# Patient Record
Sex: FEMALE
[De-identification: names, ages, dates, MRNs, and addresses within clinical notes are randomized per-mention and may not be internally consistent; named-entity substitution may affect disease eponyms.]

---

## 2017-01-19 ENCOUNTER — ASOB RESULT (OUTPATIENT)
Age: 37
End: 2017-01-19

## 2017-01-19 ENCOUNTER — APPOINTMENT (OUTPATIENT)
Dept: ANTEPARTUM | Facility: CLINIC | Age: 37
End: 2017-01-19

## 2017-03-16 ENCOUNTER — ASOB RESULT (OUTPATIENT)
Age: 37
End: 2017-03-16

## 2017-03-16 ENCOUNTER — APPOINTMENT (OUTPATIENT)
Dept: ANTEPARTUM | Facility: CLINIC | Age: 37
End: 2017-03-16

## 2017-03-30 ENCOUNTER — ASOB RESULT (OUTPATIENT)
Age: 37
End: 2017-03-30

## 2017-03-30 ENCOUNTER — APPOINTMENT (OUTPATIENT)
Dept: ANTEPARTUM | Facility: CLINIC | Age: 37
End: 2017-03-30

## 2017-07-21 ENCOUNTER — OUTPATIENT (OUTPATIENT)
Dept: OUTPATIENT SERVICES | Facility: HOSPITAL | Age: 37
LOS: 1 days | End: 2017-07-21

## 2017-07-21 DIAGNOSIS — O34.219 MATERNAL CARE FOR UNSPECIFIED TYPE SCAR FROM PREVIOUS CESAREAN DELIVERY: ICD-10-CM

## 2017-07-21 LAB
APPEARANCE UR: CLEAR — SIGNIFICANT CHANGE UP
BILIRUB UR-MCNC: NEGATIVE — SIGNIFICANT CHANGE UP
BLD GP AB SCN SERPL QL: NEGATIVE — SIGNIFICANT CHANGE UP
BLOOD UR QL VISUAL: NEGATIVE — SIGNIFICANT CHANGE UP
COLOR SPEC: SIGNIFICANT CHANGE UP
GLUCOSE UR-MCNC: NEGATIVE — SIGNIFICANT CHANGE UP
HCT VFR BLD CALC: 36.4 % — SIGNIFICANT CHANGE UP (ref 34.5–45)
HGB BLD-MCNC: 11.6 G/DL — SIGNIFICANT CHANGE UP (ref 11.5–15.5)
KETONES UR-MCNC: NEGATIVE — SIGNIFICANT CHANGE UP
LEUKOCYTE ESTERASE UR-ACNC: NEGATIVE — SIGNIFICANT CHANGE UP
MCHC RBC-ENTMCNC: 28.4 PG — SIGNIFICANT CHANGE UP (ref 27–34)
MCHC RBC-ENTMCNC: 31.9 % — LOW (ref 32–36)
MCV RBC AUTO: 89 FL — SIGNIFICANT CHANGE UP (ref 80–100)
MUCOUS THREADS # UR AUTO: SIGNIFICANT CHANGE UP
NITRITE UR-MCNC: NEGATIVE — SIGNIFICANT CHANGE UP
NON-SQ EPI CELLS # UR AUTO: <1 — SIGNIFICANT CHANGE UP
NRBC # FLD: 0 — SIGNIFICANT CHANGE UP
PH UR: 6.5 — SIGNIFICANT CHANGE UP (ref 4.6–8)
PLATELET # BLD AUTO: 216 K/UL — SIGNIFICANT CHANGE UP (ref 150–400)
PMV BLD: 10.7 FL — SIGNIFICANT CHANGE UP (ref 7–13)
PROT UR-MCNC: NEGATIVE — SIGNIFICANT CHANGE UP
RBC # BLD: 4.09 M/UL — SIGNIFICANT CHANGE UP (ref 3.8–5.2)
RBC # FLD: 14.3 % — SIGNIFICANT CHANGE UP (ref 10.3–14.5)
RBC CASTS # UR COMP ASSIST: SIGNIFICANT CHANGE UP (ref 0–?)
RH IG SCN BLD-IMP: POSITIVE — SIGNIFICANT CHANGE UP
SP GR SPEC: 1.01 — SIGNIFICANT CHANGE UP (ref 1–1.03)
SQUAMOUS # UR AUTO: SIGNIFICANT CHANGE UP
UROBILINOGEN FLD QL: NORMAL E.U. — SIGNIFICANT CHANGE UP (ref 0.1–0.2)
WBC # BLD: 8.3 K/UL — SIGNIFICANT CHANGE UP (ref 3.8–10.5)
WBC # FLD AUTO: 8.3 K/UL — SIGNIFICANT CHANGE UP (ref 3.8–10.5)
WBC UR QL: SIGNIFICANT CHANGE UP (ref 0–?)

## 2017-07-22 ENCOUNTER — TRANSCRIPTION ENCOUNTER (OUTPATIENT)
Age: 37
End: 2017-07-22

## 2017-07-22 ENCOUNTER — INPATIENT (INPATIENT)
Facility: HOSPITAL | Age: 37
LOS: 1 days | Discharge: HOME | End: 2017-07-24
Attending: OBSTETRICS & GYNECOLOGY | Admitting: OBSTETRICS & GYNECOLOGY

## 2017-07-22 VITALS — WEIGHT: 141.1 LBS | HEIGHT: 62.5 IN

## 2017-07-22 DIAGNOSIS — Z3A.00 WEEKS OF GESTATION OF PREGNANCY NOT SPECIFIED: ICD-10-CM

## 2017-07-22 DIAGNOSIS — O26.899 OTHER SPECIFIED PREGNANCY RELATED CONDITIONS, UNSPECIFIED TRIMESTER: ICD-10-CM

## 2017-07-22 LAB
BASOPHILS # BLD AUTO: 0.03 K/UL — SIGNIFICANT CHANGE UP (ref 0–0.2)
BASOPHILS NFR BLD AUTO: 0.3 % — SIGNIFICANT CHANGE UP (ref 0–2)
BLD GP AB SCN SERPL QL: NEGATIVE — SIGNIFICANT CHANGE UP
EOSINOPHIL # BLD AUTO: 0.04 K/UL — SIGNIFICANT CHANGE UP (ref 0–0.5)
EOSINOPHIL NFR BLD AUTO: 0.4 % — SIGNIFICANT CHANGE UP (ref 0–6)
HCT VFR BLD CALC: 35.3 % — SIGNIFICANT CHANGE UP (ref 34.5–45)
HGB BLD-MCNC: 11.5 G/DL — SIGNIFICANT CHANGE UP (ref 11.5–15.5)
IMM GRANULOCYTES # BLD AUTO: 0.05 # — SIGNIFICANT CHANGE UP
IMM GRANULOCYTES NFR BLD AUTO: 0.5 % — SIGNIFICANT CHANGE UP (ref 0–1.5)
LYMPHOCYTES # BLD AUTO: 1.8 K/UL — SIGNIFICANT CHANGE UP (ref 1–3.3)
LYMPHOCYTES # BLD AUTO: 19.2 % — SIGNIFICANT CHANGE UP (ref 13–44)
MCHC RBC-ENTMCNC: 28.9 PG — SIGNIFICANT CHANGE UP (ref 27–34)
MCHC RBC-ENTMCNC: 32.6 % — SIGNIFICANT CHANGE UP (ref 32–36)
MCV RBC AUTO: 88.7 FL — SIGNIFICANT CHANGE UP (ref 80–100)
MONOCYTES # BLD AUTO: 0.88 K/UL — SIGNIFICANT CHANGE UP (ref 0–0.9)
MONOCYTES NFR BLD AUTO: 9.4 % — SIGNIFICANT CHANGE UP (ref 2–14)
NEUTROPHILS # BLD AUTO: 6.56 K/UL — SIGNIFICANT CHANGE UP (ref 1.8–7.4)
NEUTROPHILS NFR BLD AUTO: 70.2 % — SIGNIFICANT CHANGE UP (ref 43–77)
NRBC # FLD: 0 — SIGNIFICANT CHANGE UP
PLATELET # BLD AUTO: 222 K/UL — SIGNIFICANT CHANGE UP (ref 150–400)
PMV BLD: 10.4 FL — SIGNIFICANT CHANGE UP (ref 7–13)
RBC # BLD: 3.98 M/UL — SIGNIFICANT CHANGE UP (ref 3.8–5.2)
RBC # FLD: 14.3 % — SIGNIFICANT CHANGE UP (ref 10.3–14.5)
RH IG SCN BLD-IMP: POSITIVE — SIGNIFICANT CHANGE UP
T PALLIDUM AB TITR SER: NEGATIVE — SIGNIFICANT CHANGE UP
WBC # BLD: 9.36 K/UL — SIGNIFICANT CHANGE UP (ref 3.8–10.5)
WBC # FLD AUTO: 9.36 K/UL — SIGNIFICANT CHANGE UP (ref 3.8–10.5)

## 2017-07-22 RX ORDER — AER TRAVELER 0.5 G/1
1 SOLUTION RECTAL; TOPICAL EVERY 4 HOURS
Qty: 0 | Refills: 0 | Status: DISCONTINUED | OUTPATIENT
Start: 2017-07-22 | End: 2017-07-23

## 2017-07-22 RX ORDER — OXYCODONE AND ACETAMINOPHEN 5; 325 MG/1; MG/1
2 TABLET ORAL EVERY 6 HOURS
Qty: 0 | Refills: 0 | Status: DISCONTINUED | OUTPATIENT
Start: 2017-07-23 | End: 2017-07-23

## 2017-07-22 RX ORDER — HYDROCORTISONE 1 %
1 OINTMENT (GRAM) TOPICAL EVERY 4 HOURS
Qty: 0 | Refills: 0 | Status: DISCONTINUED | OUTPATIENT
Start: 2017-07-23 | End: 2017-07-23

## 2017-07-22 RX ORDER — IBUPROFEN 200 MG
600 TABLET ORAL EVERY 6 HOURS
Qty: 0 | Refills: 0 | Status: COMPLETED | OUTPATIENT
Start: 2017-07-23 | End: 2018-06-21

## 2017-07-22 RX ORDER — OXYTOCIN 10 UNIT/ML
41.67 VIAL (ML) INJECTION
Qty: 20 | Refills: 0 | Status: DISCONTINUED | OUTPATIENT
Start: 2017-07-23 | End: 2017-07-23

## 2017-07-22 RX ORDER — DIBUCAINE 1 %
1 OINTMENT (GRAM) RECTAL EVERY 4 HOURS
Qty: 0 | Refills: 0 | Status: DISCONTINUED | OUTPATIENT
Start: 2017-07-23 | End: 2017-07-24

## 2017-07-22 RX ORDER — PRAMOXINE HYDROCHLORIDE 150 MG/15G
1 AEROSOL, FOAM RECTAL EVERY 4 HOURS
Qty: 0 | Refills: 0 | Status: DISCONTINUED | OUTPATIENT
Start: 2017-07-23 | End: 2017-07-23

## 2017-07-22 RX ORDER — ESOMEPRAZOLE MAGNESIUM 40 MG/1
0 CAPSULE, DELAYED RELEASE ORAL
Qty: 0 | Refills: 0 | COMMUNITY

## 2017-07-22 RX ORDER — OXYTOCIN 10 UNIT/ML
333.33 VIAL (ML) INJECTION
Qty: 20 | Refills: 0 | Status: COMPLETED | OUTPATIENT
Start: 2017-07-22

## 2017-07-22 RX ORDER — SIMETHICONE 80 MG/1
80 TABLET, CHEWABLE ORAL EVERY 6 HOURS
Qty: 0 | Refills: 0 | Status: DISCONTINUED | OUTPATIENT
Start: 2017-07-23 | End: 2017-07-24

## 2017-07-22 RX ORDER — PENICILLIN G POTASSIUM 5000000 [IU]/1
POWDER, FOR SOLUTION INTRAMUSCULAR; INTRAPLEURAL; INTRATHECAL; INTRAVENOUS
Qty: 0 | Refills: 0 | Status: DISCONTINUED | OUTPATIENT
Start: 2017-07-22 | End: 2017-07-23

## 2017-07-22 RX ORDER — DIBUCAINE 1 %
1 OINTMENT (GRAM) RECTAL EVERY 4 HOURS
Qty: 0 | Refills: 0 | Status: DISCONTINUED | OUTPATIENT
Start: 2017-07-22 | End: 2017-07-23

## 2017-07-22 RX ORDER — IBUPROFEN 200 MG
600 TABLET ORAL EVERY 6 HOURS
Qty: 0 | Refills: 0 | Status: DISCONTINUED | OUTPATIENT
Start: 2017-07-23 | End: 2017-07-23

## 2017-07-22 RX ORDER — OXYTOCIN 10 UNIT/ML
1 VIAL (ML) INJECTION
Qty: 30 | Refills: 0 | Status: DISCONTINUED | OUTPATIENT
Start: 2017-07-22 | End: 2017-07-23

## 2017-07-22 RX ORDER — OXYTOCIN 10 UNIT/ML
333.33 VIAL (ML) INJECTION
Qty: 20 | Refills: 0 | Status: DISCONTINUED | OUTPATIENT
Start: 2017-07-22 | End: 2017-07-23

## 2017-07-22 RX ORDER — SODIUM CHLORIDE 9 MG/ML
1000 INJECTION, SOLUTION INTRAVENOUS
Qty: 0 | Refills: 0 | Status: DISCONTINUED | OUTPATIENT
Start: 2017-07-22 | End: 2017-07-23

## 2017-07-22 RX ORDER — PRAMOXINE HYDROCHLORIDE 150 MG/15G
1 AEROSOL, FOAM RECTAL EVERY 4 HOURS
Qty: 0 | Refills: 0 | Status: DISCONTINUED | OUTPATIENT
Start: 2017-07-22 | End: 2017-07-23

## 2017-07-22 RX ORDER — MAGNESIUM HYDROXIDE 400 MG/1
30 TABLET, CHEWABLE ORAL
Qty: 0 | Refills: 0 | Status: DISCONTINUED | OUTPATIENT
Start: 2017-07-23 | End: 2017-07-24

## 2017-07-22 RX ORDER — DOCUSATE SODIUM 100 MG
100 CAPSULE ORAL
Qty: 0 | Refills: 0 | Status: DISCONTINUED | OUTPATIENT
Start: 2017-07-23 | End: 2017-07-24

## 2017-07-22 RX ORDER — SODIUM CHLORIDE 9 MG/ML
3 INJECTION INTRAMUSCULAR; INTRAVENOUS; SUBCUTANEOUS EVERY 8 HOURS
Qty: 0 | Refills: 0 | Status: DISCONTINUED | OUTPATIENT
Start: 2017-07-22 | End: 2017-07-23

## 2017-07-22 RX ORDER — OXYTOCIN 10 UNIT/ML
41.67 VIAL (ML) INJECTION
Qty: 20 | Refills: 0 | Status: DISCONTINUED | OUTPATIENT
Start: 2017-07-22 | End: 2017-07-23

## 2017-07-22 RX ORDER — TETANUS TOXOID, REDUCED DIPHTHERIA TOXOID AND ACELLULAR PERTUSSIS VACCINE, ADSORBED 5; 2.5; 8; 8; 2.5 [IU]/.5ML; [IU]/.5ML; UG/.5ML; UG/.5ML; UG/.5ML
0.5 SUSPENSION INTRAMUSCULAR ONCE
Qty: 0 | Refills: 0 | Status: DISCONTINUED | OUTPATIENT
Start: 2017-07-23 | End: 2017-07-24

## 2017-07-22 RX ORDER — OXYCODONE HYDROCHLORIDE 5 MG/1
5 TABLET ORAL
Qty: 0 | Refills: 0 | Status: DISCONTINUED | OUTPATIENT
Start: 2017-07-23 | End: 2017-07-24

## 2017-07-22 RX ORDER — SODIUM CHLORIDE 9 MG/ML
1000 INJECTION, SOLUTION INTRAVENOUS ONCE
Qty: 0 | Refills: 0 | Status: COMPLETED | OUTPATIENT
Start: 2017-07-22 | End: 2017-07-22

## 2017-07-22 RX ORDER — LANOLIN
1 OINTMENT (GRAM) TOPICAL EVERY 6 HOURS
Qty: 0 | Refills: 0 | Status: DISCONTINUED | OUTPATIENT
Start: 2017-07-23 | End: 2017-07-24

## 2017-07-22 RX ORDER — AER TRAVELER 0.5 G/1
1 SOLUTION RECTAL; TOPICAL EVERY 4 HOURS
Qty: 0 | Refills: 0 | Status: DISCONTINUED | OUTPATIENT
Start: 2017-07-23 | End: 2017-07-24

## 2017-07-22 RX ORDER — GLYCERIN ADULT
1 SUPPOSITORY, RECTAL RECTAL AT BEDTIME
Qty: 0 | Refills: 0 | Status: DISCONTINUED | OUTPATIENT
Start: 2017-07-23 | End: 2017-07-24

## 2017-07-22 RX ORDER — HYDROCORTISONE 1 %
1 OINTMENT (GRAM) TOPICAL EVERY 4 HOURS
Qty: 0 | Refills: 0 | Status: DISCONTINUED | OUTPATIENT
Start: 2017-07-22 | End: 2017-07-23

## 2017-07-22 RX ORDER — PENICILLIN G POTASSIUM 5000000 [IU]/1
2.5 POWDER, FOR SOLUTION INTRAMUSCULAR; INTRAPLEURAL; INTRATHECAL; INTRAVENOUS EVERY 4 HOURS
Qty: 0 | Refills: 0 | Status: DISCONTINUED | OUTPATIENT
Start: 2017-07-22 | End: 2017-07-23

## 2017-07-22 RX ORDER — PENICILLIN G POTASSIUM 5000000 [IU]/1
5 POWDER, FOR SOLUTION INTRAMUSCULAR; INTRAPLEURAL; INTRATHECAL; INTRAVENOUS ONCE
Qty: 0 | Refills: 0 | Status: COMPLETED | OUTPATIENT
Start: 2017-07-22 | End: 2017-07-22

## 2017-07-22 RX ORDER — KETOROLAC TROMETHAMINE 30 MG/ML
30 SYRINGE (ML) INJECTION ONCE
Qty: 0 | Refills: 0 | Status: DISCONTINUED | OUTPATIENT
Start: 2017-07-23 | End: 2017-07-23

## 2017-07-22 RX ORDER — SODIUM CHLORIDE 9 MG/ML
3 INJECTION INTRAMUSCULAR; INTRAVENOUS; SUBCUTANEOUS EVERY 8 HOURS
Qty: 0 | Refills: 0 | Status: DISCONTINUED | OUTPATIENT
Start: 2017-07-23 | End: 2017-07-24

## 2017-07-22 RX ORDER — ACETAMINOPHEN 500 MG
650 TABLET ORAL EVERY 6 HOURS
Qty: 0 | Refills: 0 | Status: DISCONTINUED | OUTPATIENT
Start: 2017-07-23 | End: 2017-07-23

## 2017-07-22 RX ORDER — OXYCODONE HYDROCHLORIDE 5 MG/1
5 TABLET ORAL EVERY 4 HOURS
Qty: 0 | Refills: 0 | Status: DISCONTINUED | OUTPATIENT
Start: 2017-07-23 | End: 2017-07-24

## 2017-07-22 RX ORDER — ACETAMINOPHEN 500 MG
975 TABLET ORAL EVERY 6 HOURS
Qty: 0 | Refills: 0 | Status: COMPLETED | OUTPATIENT
Start: 2017-07-23 | End: 2018-06-21

## 2017-07-22 RX ORDER — DIPHENHYDRAMINE HCL 50 MG
25 CAPSULE ORAL EVERY 6 HOURS
Qty: 0 | Refills: 0 | Status: DISCONTINUED | OUTPATIENT
Start: 2017-07-23 | End: 2017-07-24

## 2017-07-22 RX ADMIN — Medication 125 MILLIUNIT(S)/MIN: at 22:30

## 2017-07-22 RX ADMIN — SODIUM CHLORIDE 125 MILLILITER(S): 9 INJECTION, SOLUTION INTRAVENOUS at 19:36

## 2017-07-22 RX ADMIN — Medication 1000 MILLIUNIT(S)/MIN: at 22:32

## 2017-07-22 RX ADMIN — SODIUM CHLORIDE 125 MILLILITER(S): 9 INJECTION, SOLUTION INTRAVENOUS at 18:06

## 2017-07-22 RX ADMIN — PENICILLIN G POTASSIUM 200 MILLION UNIT(S): 5000000 POWDER, FOR SOLUTION INTRAMUSCULAR; INTRAPLEURAL; INTRATHECAL; INTRAVENOUS at 17:37

## 2017-07-22 RX ADMIN — SODIUM CHLORIDE 125 MILLILITER(S): 9 INJECTION, SOLUTION INTRAVENOUS at 17:37

## 2017-07-22 RX ADMIN — SODIUM CHLORIDE 2000 MILLILITER(S): 9 INJECTION, SOLUTION INTRAVENOUS at 18:06

## 2017-07-22 RX ADMIN — Medication 1 MILLIUNIT(S)/MIN: at 19:35

## 2017-07-22 NOTE — DISCHARGE NOTE OB - CARE PROVIDER_API CALL
Chloé Steel (MD), Obstetrics and Gynecology  [address] [place] [address]  Phone: [phone]  Fax: [phone]

## 2017-07-22 NOTE — DISCHARGE NOTE OB - MEDICATION SUMMARY - MEDICATIONS TO TAKE
I will START or STAY ON the medications listed below when I get home from the hospital:  None I will START or STAY ON the medications listed below when I get home from the hospital:    acetaminophen 325 mg oral tablet  -- 3 tab(s) by mouth every 6 hours  -- Indication: For pain management    ibuprofen 600 mg oral tablet  -- 1 tab(s) by mouth every 6 hours  -- Indication: For pain management I will START or STAY ON the medications listed below when I get home from the hospital:    acetaminophen 325 mg oral tablet  -- 3 tab(s) by mouth every 6 hours, As Needed  -- Indication: For pain    ibuprofen 600 mg oral tablet  -- 1 tab(s) by mouth every 6 hours, As Needed  -- Indication: For pain    Prenatal Multivitamins with Folic Acid 1 mg oral tablet  -- 1 tab(s) by mouth once a day  -- Indication: For Vitamins

## 2017-07-22 NOTE — DISCHARGE NOTE OB - MATERIALS PROVIDED
[facility] Hearing Screen Program/[facility] [place] Screening Program/Guide to Postpartum Care/Shaken Baby Prevention Handout/Tdap Vaccination (VIS Pub Date: 2012)/  Immunization Record/Breastfeeding Log/Birth Certificate Instructions/Vaccinations

## 2017-07-22 NOTE — DISCHARGE NOTE OB - PATIENT PORTAL LINK FT
“You can access the Follow[facility] Patient Portal, offered by [facility], by registering with the following website: [email or web address]”

## 2017-07-22 NOTE — DISCHARGE NOTE OB - CARE PLAN
Principal Discharge DX:	Vacuum extractor delivery, delivered  Goal:	Home  Instructions for follow-up, activity and diet:	6 weeks, vaginal rest, regular diet

## 2017-07-23 RX ORDER — PRAMOXINE HYDROCHLORIDE 150 MG/15G
1 AEROSOL, FOAM RECTAL EVERY 4 HOURS
Qty: 0 | Refills: 0 | Status: DISCONTINUED | OUTPATIENT
Start: 2017-07-23 | End: 2017-07-24

## 2017-07-23 RX ORDER — ACETAMINOPHEN 500 MG
3 TABLET ORAL
Qty: 0 | Refills: 0 | COMMUNITY
Start: 2017-07-23

## 2017-07-23 RX ORDER — HYDROCORTISONE 1 %
1 OINTMENT (GRAM) TOPICAL EVERY 4 HOURS
Qty: 0 | Refills: 0 | Status: DISCONTINUED | OUTPATIENT
Start: 2017-07-23 | End: 2017-07-24

## 2017-07-23 RX ORDER — ACETAMINOPHEN 500 MG
975 TABLET ORAL EVERY 6 HOURS
Qty: 0 | Refills: 0 | Status: DISCONTINUED | OUTPATIENT
Start: 2017-07-23 | End: 2017-07-24

## 2017-07-23 RX ORDER — IBUPROFEN 200 MG
1 TABLET ORAL
Qty: 0 | Refills: 0 | COMMUNITY
Start: 2017-07-23

## 2017-07-23 RX ORDER — IBUPROFEN 200 MG
600 TABLET ORAL EVERY 6 HOURS
Qty: 0 | Refills: 0 | Status: DISCONTINUED | OUTPATIENT
Start: 2017-07-23 | End: 2017-07-24

## 2017-07-23 RX ADMIN — SODIUM CHLORIDE 3 MILLILITER(S): 9 INJECTION INTRAMUSCULAR; INTRAVENOUS; SUBCUTANEOUS at 06:18

## 2017-07-23 RX ADMIN — Medication 975 MILLIGRAM(S): at 11:00

## 2017-07-23 RX ADMIN — Medication 650 MILLIGRAM(S): at 02:30

## 2017-07-23 RX ADMIN — Medication 100 MILLIGRAM(S): at 20:00

## 2017-07-23 RX ADMIN — Medication 975 MILLIGRAM(S): at 20:00

## 2017-07-23 RX ADMIN — Medication 650 MILLIGRAM(S): at 03:00

## 2017-07-23 RX ADMIN — Medication 600 MILLIGRAM(S): at 20:00

## 2017-07-23 RX ADMIN — Medication 1 TABLET(S): at 11:41

## 2017-07-23 RX ADMIN — Medication 975 MILLIGRAM(S): at 12:00

## 2017-07-23 RX ADMIN — SODIUM CHLORIDE 3 MILLILITER(S): 9 INJECTION INTRAMUSCULAR; INTRAVENOUS; SUBCUTANEOUS at 14:00

## 2017-07-23 NOTE — PROGRESS NOTE ADULT - SUBJECTIVE AND OBJECTIVE BOX
PPD#1   Patient seen and examined at bedside, no acute overnight events. No acute complaints, pain well controlled.  Patient is ambulating, voiding spontaneously, passing gas, and tolerating regular diet. Pt is breast feeding her baby.    Vital Signs Last 24 Hours  T(C): 36.8 (07-23-17 @ 01:40), Max: 36.8 (07-22-17 @ 22:20)  HR: 97 (07-23-17 @ 01:40) (83 - 104)  BP: 112/64 (07-23-17 @ 01:40) (11/58 - 114/56)  RR: 18 (07-23-17 @ 01:40) (18 - 18)  SpO2: 100% (07-23-17 @ 01:40) (100% - 100%)    Physical Exam:  General: NAD  Abdomen: Soft, non-tender, non-distended, fundus firm  Pelvic: Lochia wnl  Ext: NTBL  Labs:  Blood type: B Positive  Rubella IgG: RPR: Negative                          11.5   9.36 >-----------< 222    ( 07-22 @ 17:30 )             35.3                        11.6   8.30 >-----------< 216    ( 07-21 @ 10:08 )             36.4                  MEDICATIONS  (STANDING):  oxytocin Infusion 1 milliUNIT(s)/Min (1 mL/Hr) IV Continuous <Continuous>  oxytocin Infusion 41.667 milliUNIT(s)/Min (125 mL/Hr) IV Continuous <Continuous>  sodium chloride 0.9% lock flush 3 milliLiter(s) IV Push every 8 hours  diphtheria/tetanus/pertussis (acellular) Vaccine (ADAcel) 0.5 milliLiter(s) IntraMuscular once  oxytocin Infusion 41.667 milliUNIT(s)/Min (125 mL/Hr) IV Continuous <Continuous>  prenatal multivitamin 1 Tablet(s) Oral daily  ketorolac   Injectable 30 milliGRAM(s) IV Push once  ibuprofen  Tablet 600 milliGRAM(s) Oral every 6 hours  acetaminophen   Tablet. 975 milliGRAM(s) Oral every 6 hours  oxyCODONE    IR 5 milliGRAM(s) Oral every 3 hours  oxytocin Infusion 333.333 milliUNIT(s)/Min (1000 mL/Hr) IV Continuous <Continuous>    MEDICATIONS  (PRN):  acetaminophen   Tablet. 650 milliGRAM(s) Oral every 6 hours PRN Mild Pain  acetaminophen   Tablet 650 milliGRAM(s) Oral every 6 hours PRN For Temp greater than 38.5 C (101.3 F)  ibuprofen  Tablet 600 milliGRAM(s) Oral every 6 hours PRN Moderate Pain  oxyCODONE    5 mG/acetaminophen 325 mG 2 Tablet(s) Oral every 6 hours PRN Severe Pain  hydrocortisone 1% Cream 1 Application(s) Topical every 4 hours PRN Moderate to Severe Perineal Pain  pramoxine 1%/zinc 5% Cream 1 Application(s) Topical every 4 hours PRN Moderate to Severe Perineal Pain  dibucaine 1% Ointment 1 Application(s) Topical every 4 hours PRN Perineal Discomfort  lanolin Ointment 1 Application(s) Topical every 6 hours PRN Sore Nipples  witch hazel Pads 1 Application(s) Topical every 4 hours PRN Perineal Discomfort  simethicone 80 milliGRAM(s) Chew every 6 hours PRN Gas  diphenhydrAMINE   Capsule 25 milliGRAM(s) Oral every 6 hours PRN Itching  glycerin Suppository - Adult 1 Suppository(s) Rectal at bedtime PRN Constipation  magnesium hydroxide Suspension 30 milliLiter(s) Oral two times a day PRN Constipation  docusate sodium 100 milliGRAM(s) Oral two times a day PRN Stool Softening  oxyCODONE    IR 5 milliGRAM(s) Oral every 4 hours PRN Severe Pain (7 -10)

## 2017-07-24 VITALS
HEART RATE: 87 BPM | OXYGEN SATURATION: 100 % | DIASTOLIC BLOOD PRESSURE: 53 MMHG | SYSTOLIC BLOOD PRESSURE: 100 MMHG | TEMPERATURE: 98 F | RESPIRATION RATE: 18 BRPM

## 2017-07-24 DIAGNOSIS — O26.899 OTHER SPECIFIED PREGNANCY RELATED CONDITIONS, UNSPECIFIED TRIMESTER: ICD-10-CM

## 2017-07-24 DIAGNOSIS — O42.10 PREMATURE RUPTURE OF MEMBRANES, ONSET OF LABOR MORE THAN 24 HOURS FOLLOWING RUPTURE, UNSPECIFIED WEEKS OF GESTATION: ICD-10-CM

## 2017-07-24 LAB — T PALLIDUM AB TITR SER: NEGATIVE — SIGNIFICANT CHANGE UP

## 2017-07-24 RX ADMIN — Medication 975 MILLIGRAM(S): at 03:00

## 2017-07-24 RX ADMIN — Medication 600 MILLIGRAM(S): at 03:00

## 2017-07-24 RX ADMIN — Medication 600 MILLIGRAM(S): at 02:02

## 2017-07-24 RX ADMIN — Medication 975 MILLIGRAM(S): at 02:02

## 2018-06-21 ENCOUNTER — NON-APPOINTMENT (OUTPATIENT)
Age: 38
End: 2018-06-21

## 2018-06-21 ENCOUNTER — APPOINTMENT (OUTPATIENT)
Dept: INTERNAL MEDICINE | Facility: CLINIC | Age: 38
End: 2018-06-21
Payer: COMMERCIAL

## 2018-06-21 VITALS
TEMPERATURE: 98.5 F | WEIGHT: 114 LBS | SYSTOLIC BLOOD PRESSURE: 100 MMHG | BODY MASS INDEX: 20.98 KG/M2 | OXYGEN SATURATION: 98 % | HEART RATE: 88 BPM | RESPIRATION RATE: 14 BRPM | HEIGHT: 62 IN | DIASTOLIC BLOOD PRESSURE: 60 MMHG

## 2018-06-21 DIAGNOSIS — Z83.3 FAMILY HISTORY OF DIABETES MELLITUS: ICD-10-CM

## 2018-06-21 DIAGNOSIS — N94.89 OTHER SPECIFIED CONDITIONS ASSOCIATED WITH FEMALE GENITAL ORGANS AND MENSTRUAL CYCLE: ICD-10-CM

## 2018-06-21 DIAGNOSIS — Z01.419 ENCOUNTER FOR GYNECOLOGICAL EXAMINATION (GENERAL) (ROUTINE) W/OUT ABNORMAL FINDINGS: ICD-10-CM

## 2018-06-21 DIAGNOSIS — Z82.49 FAMILY HISTORY OF ISCHEMIC HEART DISEASE AND OTHER DISEASES OF THE CIRCULATORY SYSTEM: ICD-10-CM

## 2018-06-21 PROCEDURE — 36415 COLL VENOUS BLD VENIPUNCTURE: CPT

## 2018-06-21 PROCEDURE — 93000 ELECTROCARDIOGRAM COMPLETE: CPT

## 2018-06-21 PROCEDURE — 99385 PREV VISIT NEW AGE 18-39: CPT | Mod: 25

## 2018-06-21 NOTE — REVIEW OF SYSTEMS
[Fever] : no fever [Chills] : no chills [Cough] : no cough [Dyspnea on Exertion] : no dyspnea on exertion [Abdominal Pain] : no abdominal pain [Melena] : no melena [Dizziness] : no dizziness [FreeTextEntry5] : see HPI [FreeTextEntry7] : see HPI [de-identified] : see HPI

## 2018-06-21 NOTE — ASSESSMENT
[FreeTextEntry1] : \par atypical CP, +GH CAD-\par -EKG: NSR @ 80 bpm, nl axis, no LVH/ST/T chgs\par -check labs: cbc/cmp/lipids/A1c\par -cardio referral for eval\par -advised to go to ER if sx's worsen\par \par GERD-\par -trial of PPI (safe with nursing)\par -advised to avoid aggravating foods and meals near bedtime\par -to f/u if sx's worsen\par \par depression- minimal on PHQ 9; denies acute sx's\par -declines BH referral\par -declines info to behavioral health crisis walk in\par -advised to f/u if sx's worsen\par \par nursing-\par -prenatal MVI advised, escribed\par \par \par HCM\par -fasting screening labs; STD/HIV screening\par -declines flu shots\par -hx Tdap 2012\par -hx hep B series\par -hx negative PAP 2017 by GYN per pt, +IUD in place\par -derm referral for screening.  Regular use of sun block for skin cancer prevention advised.\par -yearly dental screening advised, referral given\par -yearly eye screening advised, referral given\par \par Pt request  Jose A be called re: labs/medical care, cell: [phone]

## 2018-06-21 NOTE — HISTORY OF PRESENT ILLNESS
[Reg. Dental Visits] : ~He/She~ does not have regular dental visits [Vision Problems] : She denies vision problems [Hearing Loss] : She denies hearing loss [de-identified] : declines flu shot, Tdap 2012, hx hep B series [de-identified] : \par Accompanied by .\par \par Feeling well today.\par Fasting for labs.\par \par c/o left chest pain- on/off 1.5 mo, no relation to exertion, no worse with exertion\par -pressure-like, last few minutes; not a/w dizziness, sob, palpitations, stress or reflux.  Occ a/w belching and reflux with CP but separate feeling from pressure pain.  Does eat lots of spicy food regularly and eat close to bedtime.\par -denies fever, chills, cough or recent URI/illness\par -+FH CAD: mom with hx MI in 50s\par \par East[place] healthy.\par No formal exercising, is active with 3 young kids\par Reports nl appetite and BMs; denies BRBPR or melena\par Denies n/v/abd pain\par Wt stable in past year\par \par occ feeling sad- 2/2 finances, caring for 3 young kids (youngest 11 mo , is nursing him and he wakes up often)\par -denies HI/SI or anxiety.\par -has good social support\par \par Sexually active with , has IUD for contraception; denies hx STD dx.\par -denies  complaints.

## 2018-06-22 LAB
ALBUMIN SERPL ELPH-MCNC: 5 G/DL
ALP BLD-CCNC: 41 U/L
ALT SERPL-CCNC: 16 U/L
ANION GAP SERPL CALC-SCNC: 16 MMOL/L
AST SERPL-CCNC: 17 U/L
BASOPHILS # BLD AUTO: 0.02 K/UL
BASOPHILS NFR BLD AUTO: 0.3 %
BILIRUB SERPL-MCNC: 1 MG/DL
BUN SERPL-MCNC: 14 MG/DL
C TRACH RRNA SPEC QL NAA+PROBE: NOT DETECTED
CALCIUM SERPL-MCNC: 9.4 MG/DL
CHLORIDE SERPL-SCNC: 103 MMOL/L
CHOLEST SERPL-MCNC: 203 MG/DL
CHOLEST/HDLC SERPL: 4.5 RATIO
CO2 SERPL-SCNC: 22 MMOL/L
CREAT SERPL-MCNC: 0.72 MG/DL
EOSINOPHIL # BLD AUTO: 0.05 K/UL
EOSINOPHIL NFR BLD AUTO: 0.8 %
GLUCOSE SERPL-MCNC: 103 MG/DL
HBA1C MFR BLD HPLC: 5.6 %
HBV CORE IGG+IGM SER QL: NONREACTIVE
HBV SURFACE AB SER QL: REACTIVE
HBV SURFACE AG SER QL: NONREACTIVE
HCT VFR BLD CALC: 43.5 %
HCV AB SER QL: NONREACTIVE
HCV S/CO RATIO: 0.13 S/CO
HDLC SERPL-MCNC: 45 MG/DL
HGB BLD-MCNC: 14.3 G/DL
HIV1+2 AB SPEC QL IA.RAPID: NONREACTIVE
IMM GRANULOCYTES NFR BLD AUTO: 0.2 %
LDLC SERPL CALC-MCNC: 146 MG/DL
LYMPHOCYTES # BLD AUTO: 1.66 K/UL
LYMPHOCYTES NFR BLD AUTO: 26.5 %
MAN DIFF?: NORMAL
MCHC RBC-ENTMCNC: 30.4 PG
MCHC RBC-ENTMCNC: 32.9 GM/DL
MCV RBC AUTO: 92.4 FL
MONOCYTES # BLD AUTO: 0.28 K/UL
MONOCYTES NFR BLD AUTO: 4.5 %
N GONORRHOEA RRNA SPEC QL NAA+PROBE: NOT DETECTED
NEUTROPHILS # BLD AUTO: 4.24 K/UL
NEUTROPHILS NFR BLD AUTO: 67.7 %
PLATELET # BLD AUTO: 277 K/UL
POTASSIUM SERPL-SCNC: 4.6 MMOL/L
PROT SERPL-MCNC: 7.9 G/DL
RBC # BLD: 4.71 M/UL
RBC # FLD: 13.5 %
SODIUM SERPL-SCNC: 141 MMOL/L
SOURCE AMPLIFICATION: NORMAL
T PALLIDUM AB SER QL IA: NEGATIVE
TRIGL SERPL-MCNC: 62 MG/DL
TSH SERPL-ACNC: 2.1 UIU/ML
WBC # FLD AUTO: 6.26 K/UL

## 2018-08-30 ENCOUNTER — APPOINTMENT (OUTPATIENT)
Dept: INTERNAL MEDICINE | Facility: CLINIC | Age: 38
End: 2018-08-30

## 2018-12-05 ENCOUNTER — APPOINTMENT (OUTPATIENT)
Dept: INTERNAL MEDICINE | Facility: CLINIC | Age: 38
End: 2018-12-05
Payer: COMMERCIAL

## 2018-12-05 VITALS
BODY MASS INDEX: 21.16 KG/M2 | TEMPERATURE: 98.1 F | SYSTOLIC BLOOD PRESSURE: 112 MMHG | HEIGHT: 62 IN | HEART RATE: 71 BPM | DIASTOLIC BLOOD PRESSURE: 72 MMHG | OXYGEN SATURATION: 98 % | WEIGHT: 115 LBS | RESPIRATION RATE: 14 BRPM

## 2018-12-05 PROCEDURE — 90472 IMMUNIZATION ADMIN EACH ADD: CPT

## 2018-12-05 PROCEDURE — 90734 MENACWYD/MENACWYCRM VACC IM: CPT

## 2018-12-05 PROCEDURE — G0008: CPT

## 2018-12-05 PROCEDURE — 99214 OFFICE O/P EST MOD 30 MIN: CPT | Mod: 25

## 2018-12-05 PROCEDURE — 90686 IIV4 VACC NO PRSV 0.5 ML IM: CPT

## 2018-12-05 NOTE — ASSESSMENT
[FreeTextEntry1] : \par hx atypical CP, +GH CAD- asx since 6/18 per pt\par -6/18 EKG: NSR @ 80 bpm, nl axis, no LVH/ST/T chgs\par -6/18 cbc/cmp/A1c unremarkable\par -cardio referral for eval given prior, pending\par -advised to go to ER if sx's recur\par \par GERD- now resolved\par -hx trial of PPI (safe with nursing), using prn\par -advised to cont to avoid aggravating foods and meals near bedtime\par \par HLD- 6/18 Tchol 203 TG 62  HDL 45\par -low fat diet, exercise advised\par -check lipids, slip given per request\par \par depression- stable, minimal on PHQ 9; denies acute sx's\par -declines BH referral\par -declines info to behavioral health crisis walk in\par -advised to f/u if sx's worsen\par \par nursing-\par -prenatal MVI advised\par \par \par HCM\par -hx CPE 6/18\par -flu shot today\par -hx Tdap 2012\par -hx hep B series\par -meningitis vaccine today for planned [place] 2/19\par -hx negative PAP 2017 by GYN per pt, +IUD in place\par -derm referral for screening- pending.  Regular use of sun block for skin cancer prevention advised.\par -yearly dental screening advised, referral given- pending\par -yearly eye screening advised, referral given\par \par Pt request  Jose A be called re: labs/medical care, cell: [phone]\par pt declines f/u appt, yearly CPE and f/u as needed advised.

## 2018-12-05 NOTE — REVIEW OF SYSTEMS
[Negative] : Neurological [Fever] : no fever [Chills] : no chills [Cough] : no cough [Dyspnea on Exertion] : no dyspnea on exertion [Abdominal Pain] : no abdominal pain [Nausea] : no nausea [Vomiting] : no vomiting [Melena] : no melena [Dizziness] : no dizziness [FreeTextEntry5] : see HPI [FreeTextEntry7] : see HPI [de-identified] : see HPI

## 2018-12-05 NOTE — PHYSICAL EXAM
[No Acute Distress] : no acute distress [Well-Appearing] : well-appearing [Normal Sclera/Conjunctiva] : normal sclera/conjunctiva [PERRL] : pupils equal round and reactive to light [EOMI] : extraocular movements intact [Normal Oropharynx] : the oropharynx was normal [Normal Nasal Mucosa] : the nasal mucosa was normal [Supple] : supple [No Lymphadenopathy] : no lymphadenopathy [Thyroid Normal, No Nodules] : the thyroid was normal and there were no nodules present [No Respiratory Distress] : no respiratory distress  [Clear to Auscultation] : lungs were clear to auscultation bilaterally [Normal Rate] : normal rate  [Regular Rhythm] : with a regular rhythm [Normal S1, S2] : normal S1 and S2 [No Murmur] : no murmur heard [No Edema] : there was no peripheral edema [Soft] : abdomen soft [Non Tender] : non-tender [No HSM] : no HSM [No CVA Tenderness] : no CVA  tenderness [No Spinal Tenderness] : no spinal tenderness [No Joint Swelling] : no joint swelling [Grossly Normal Strength/Tone] : grossly normal strength/tone [No Rash] : no rash [Normal Gait] : normal gait [No Focal Deficits] : no focal deficits [Normal Affect] : the affect was normal [Alert and Oriented x3] : oriented to person, place, and time

## 2018-12-05 NOTE — HISTORY OF PRESENT ILLNESS
[de-identified] : \par Accompanied by .\par \par Feeling well today.\par Not fasting - wants lab slip to check lipids\par \par Needs meningitis vaccine as in 2/19 plans to go to [place].\par \par \par hx left chest pain- no recurrence since last visit \par -hx onset on/off 1.5 mo (reported at 6/18 visit), no relation to exertion, no worse with exertion\par -pressure-like, last few minutes; not a/w dizziness, sob, palpitations, stress or reflux.  Occ a/w belching and reflux with CP but separate feeling from pressure pain.  Does eat lots of spicy food regularly and eat close to bedtime.\par -+FH CAD: mom with hx MI in 50s\par \par GERD- resolved, hx PPI use prn, no recent use.\par -avoids aggravating foods\par \par HLD-\par -eating healthy, cut down on fatty foods\par -no formal exercising, is active with 3 young kids\par \par Reports nl appetite and BMs; denies BRBPR or melena\par Denies n/v/abd pain\par Wt stable in past year\par \par hx occ feeling sad- stable, 2/2 finances, caring for 3 young kids (is nursing him and he wakes up often)\par -denies HI/SI or anxiety.\par -has good social support\par \par \par Denies  complaints.

## 2018-12-06 ENCOUNTER — TRANSCRIPTION ENCOUNTER (OUTPATIENT)
Age: 38
End: 2018-12-06

## 2019-01-12 ENCOUNTER — LABORATORY RESULT (OUTPATIENT)
Age: 39
End: 2019-01-12

## 2019-01-13 LAB
CHOLEST SERPL-MCNC: 224 MG/DL
CHOLEST/HDLC SERPL: 5.9 RATIO
HDLC SERPL-MCNC: 38 MG/DL
LDLC SERPL CALC-MCNC: 154 MG/DL
TRIGL SERPL-MCNC: 160 MG/DL

## 2019-01-18 ENCOUNTER — APPOINTMENT (OUTPATIENT)
Dept: INTERNAL MEDICINE | Facility: CLINIC | Age: 39
End: 2019-01-18
Payer: COMMERCIAL

## 2019-01-18 VITALS
DIASTOLIC BLOOD PRESSURE: 70 MMHG | HEART RATE: 82 BPM | SYSTOLIC BLOOD PRESSURE: 114 MMHG | HEIGHT: 62 IN | BODY MASS INDEX: 21.35 KG/M2 | OXYGEN SATURATION: 98 % | TEMPERATURE: 98.5 F | WEIGHT: 116 LBS

## 2019-01-18 PROCEDURE — 36415 COLL VENOUS BLD VENIPUNCTURE: CPT

## 2019-01-18 PROCEDURE — 99214 OFFICE O/P EST MOD 30 MIN: CPT | Mod: 25

## 2019-01-19 NOTE — ASSESSMENT
[FreeTextEntry1] : 37yo F with pmh of syncope here for follow up - will with issue related to dizziness.\par \par Syncope - ddx include dehydration, low glucose\par                  has cards referral - hod on ordering echo\par                  encourage hydration\par                  r/o insulinoma - send c-peptide and insulin level\par                  referral to neurology\par \par Paresthesia - A1c wnl in June 2018\par                       check B12\par                       try R hand splint\par \par check vitamin D

## 2019-01-19 NOTE — HISTORY OF PRESENT ILLNESS
[FreeTextEntry8] : 39yo F here for acute visit.\par reports episode of syncope in Nov after received 2 shots\par has numbness in her finger tips\par also with episodes of dizziness prior to getting the shots\par \par has 3 children at home\par \par feels gets dizzy whn she does not eat\par eats a full diet - little water intake\par \par Remainder of ROS reviewed and found to be negative.\par

## 2019-01-19 NOTE — PHYSICAL EXAM
[de-identified] : Gen: Adult, NAD\par Head: NC/AT\par EENT: ears grossly normal, PERRL, EOMI moist mucosa\par Neck: supple\par Chest wall: nontender\par CV: normal s1 +s2, rrr, no murmurs\par Pulm: cCTA-B\par Abd: soft, NT, ND\par Skin: no rashes\par Back: no CVA tenderness, no spinal tenderness\par Neuro: gait normal, AAOx3\par Psych: normal affect, normal interaction\par

## 2019-01-21 ENCOUNTER — TRANSCRIPTION ENCOUNTER (OUTPATIENT)
Age: 39
End: 2019-01-21

## 2019-01-26 LAB
25(OH)D3 SERPL-MCNC: 42 NG/ML
C PEPTIDE SERPL-MCNC: 4.3 NG/ML
INSULIN SERPL-MCNC: 26.9 UU/ML
VIT B12 SERPL-MCNC: 969 PG/ML

## 2019-05-02 ENCOUNTER — OTHER (OUTPATIENT)
Age: 39
End: 2019-05-02

## 2019-06-26 ENCOUNTER — APPOINTMENT (OUTPATIENT)
Dept: INTERNAL MEDICINE | Facility: CLINIC | Age: 39
End: 2019-06-26
Payer: COMMERCIAL

## 2019-06-26 VITALS
DIASTOLIC BLOOD PRESSURE: 62 MMHG | HEIGHT: 62 IN | SYSTOLIC BLOOD PRESSURE: 100 MMHG | WEIGHT: 122 LBS | OXYGEN SATURATION: 99 % | BODY MASS INDEX: 22.45 KG/M2 | TEMPERATURE: 97.9 F | HEART RATE: 79 BPM | RESPIRATION RATE: 16 BRPM

## 2019-06-26 DIAGNOSIS — Z87.19 PERSONAL HISTORY OF OTHER DISEASES OF THE DIGESTIVE SYSTEM: ICD-10-CM

## 2019-06-26 DIAGNOSIS — R20.2 PARESTHESIA OF SKIN: ICD-10-CM

## 2019-06-26 DIAGNOSIS — Z87.898 PERSONAL HISTORY OF OTHER SPECIFIED CONDITIONS: ICD-10-CM

## 2019-06-26 DIAGNOSIS — R07.89 OTHER CHEST PAIN: ICD-10-CM

## 2019-06-26 PROCEDURE — 99395 PREV VISIT EST AGE 18-39: CPT

## 2019-06-26 RX ORDER — OMEPRAZOLE 20 MG/1
20 TABLET, DELAYED RELEASE ORAL
Qty: 30 | Refills: 1 | Status: DISCONTINUED | COMMUNITY
Start: 2018-06-21 | End: 2019-06-26

## 2019-06-26 RX ORDER — ERGOCALCIFEROL (VITAMIN D2) 10 MCG
27-0.8 TABLET ORAL DAILY
Qty: 1 | Refills: 1 | Status: DISCONTINUED | COMMUNITY
Start: 2018-06-21 | End: 2019-06-26

## 2019-06-26 NOTE — ASSESSMENT
[FreeTextEntry1] : \par hx syncope - thought vasovagal s/p vaccine administration, hx UC visit with negative labs/EKG, asx\par -neuro eval pending\par \par CTS- resolved\par -using splint prn\par -Tylenol prn \par \par hx atypical CP, +GH CAD- asx since 6/18 per pt\par -followed by cardio ([facility] [place] hx negative stress echo\par -advised to go to ER if sx's recur\par \par GERD- now resolved\par -hx trial of PPI (safe with nursing), using prn\par -advised to cont to avoid aggravating foods and meals near bedtime\par \par HLD- 1/19 Tchol 224   HDL 38\par -low fat diet, exercise advised\par -check lipids, slip given per request\par \par 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  Regular use of sun block for skin cancer prevention advised.\par -yearly dental screening advised, referral given- pending\par -yearly eye screening advised, referral given\par \par Pt request  Jose A be called re: labs/medical care, cell: [phone]\par pt declines f/u appt, yearly CPE and f/u as needed advised.

## 2019-06-26 NOTE — PAST MEDICAL HISTORY
[Total Preg ___] : G[unfilled] [Living ___] : Living: [unfilled] [Irregular Cycle Intervals] : are  irregular [FreeTextEntry1] : has IUD

## 2019-06-26 NOTE — PHYSICAL EXAM
[No Acute Distress] : no acute distress [Well-Appearing] : well-appearing [Normal Sclera/Conjunctiva] : normal sclera/conjunctiva [PERRL] : pupils equal round and reactive to light [EOMI] : extraocular movements intact [Normal Oropharynx] : the oropharynx was normal [Normal Nasal Mucosa] : the nasal mucosa was normal [Thyroid Normal, No Nodules] : the thyroid was normal and there were no nodules present [Supple] : supple [No Lymphadenopathy] : no lymphadenopathy [Normal Rate] : normal rate  [Clear to Auscultation] : lungs were clear to auscultation bilaterally [No Respiratory Distress] : no respiratory distress  [Normal S1, S2] : normal S1 and S2 [Regular Rhythm] : with a regular rhythm [No Murmur] : no murmur heard [No Edema] : there was no peripheral edema [Non Tender] : non-tender [Soft] : abdomen soft [No HSM] : no HSM [No CVA Tenderness] : no CVA  tenderness [No Spinal Tenderness] : no spinal tenderness [Grossly Normal Strength/Tone] : grossly normal strength/tone [No Joint Swelling] : no joint swelling [No Rash] : no rash [Normal Gait] : normal gait [Normal Affect] : the affect was normal [No Focal Deficits] : no focal deficits [Alert and Oriented x3] : oriented to person, place, and time [Normal TMs] : both tympanic membranes were normal [Pedal Pulses Present] : the pedal pulses are present [de-identified] : scattered freckles

## 2019-06-26 NOTE — HISTORY OF PRESENT ILLNESS
[Health Maintenance] : health maintenance [___ Daughter(s)] : [unfilled] daughter(s) [Spouse] : spouse [___ Son(s)] : [unfilled] son(s) [Mother] : mother [Working Part-Time] : working part-time [] :  [Never Smoked Cigarettes] : has never smoked cigarettes [Occupation ___] : occupation: [unfilled] [Never] : never drinking alcohol [Good] : good [Premenopausal] : the patient is premenopausal [Healthy Diet] : She consumes a diverse and healthy diet [de-identified] : \par Accompanied by .\par \par Feeling well today.\par Last ate 2 hrs ago- bread and veggie, wants lab slip\par \par \par Seen in office 1/19 by another provide s/p UC visit post syncope thought vasovagal s/p vaccines (flu and meningitis)- referred to neuro for eval, pending\par -reports well since visit, no recurrence of syncope, denies hx similar prior.  Episode witnessed by , denies incontinence, seizure activity or postictal state\par -denies HA, dizziness, dysarthria or focal weakness\par -request neuro referral again\par -reports hx right finger numbness reported at prior visit- has resolved, using splint prn\par \par hx left chest pain, +FH CAD- resolved > 1 yr\par -followed by cardio ([facility] [place])- hx negative stress echo\par \par GERD- resolved, hx PPI use prn, no recent use.\par -avoids aggravating foods\par \par HLD-\par -eating healthy, cut down on fatty foods\par -no formal exercising, is active with 3 young kids\par \par Reports nl appetite and BMs; denies BRBPR or melena\par Denies n/v/abd pain\par Wt stable\par \par Denies depression or anxiety.\par \par \par Denies  complaints.\par Denies hx STD dx. [Reg. Dental Visits] : ~He/She~ does not have regular dental visits [Vision Problems] : She denies vision problems [Hearing Loss] : She denies hearing loss [Regular Exercise] : She does not exercise regularly [de-identified] : declines flu shot, Tdap 2012, hx hep B series [de-identified] : 6/18

## 2019-06-26 NOTE — REVIEW OF SYSTEMS
[Negative] : Psychiatric [Fever] : no fever [Chills] : no chills [Abdominal Pain] : no abdominal pain [Cough] : no cough [Dyspnea on Exertion] : no dyspnea on exertion [Nausea] : no nausea [Unsteady Walking] : no ataxia [Melena] : no melena [Dizziness] : no dizziness [de-identified] : see HPI [FreeTextEntry5] : see HPI

## 2019-06-26 NOTE — HEALTH RISK ASSESSMENT
[0] : 2) Feeling down, depressed, or hopeless: Not at all (0) [Patient reported PAP Smear was normal] : Patient reported PAP Smear was normal [HIV Test offered] : HIV Test offered [Carbon Monoxide Detector] : carbon monoxide detector [Smoke Detector] : smoke detector [Seat Belt] :  uses seat belt [Sunscreen] : uses sunscreen [No] : No [Guns at Home] : no guns at home [HIVDate] : 06/18 [PapSmearDate] : 01/17 [HepatitisCDate] : 06/18 [HepatitisCComments] : negative [HIVComments] : negative

## 2019-08-02 ENCOUNTER — TRANSCRIPTION ENCOUNTER (OUTPATIENT)
Age: 39
End: 2019-08-02

## 2019-08-02 LAB
25(OH)D3 SERPL-MCNC: 34 NG/ML
ALBUMIN SERPL ELPH-MCNC: 4.6 G/DL
ALP BLD-CCNC: 38 U/L
ALT SERPL-CCNC: 19 U/L
ANION GAP SERPL CALC-SCNC: 14 MMOL/L
AST SERPL-CCNC: 18 U/L
BASOPHILS # BLD AUTO: 0.05 K/UL
BASOPHILS NFR BLD AUTO: 0.6 %
BILIRUB SERPL-MCNC: 0.5 MG/DL
BUN SERPL-MCNC: 10 MG/DL
C TRACH RRNA SPEC QL NAA+PROBE: NOT DETECTED
CALCIUM SERPL-MCNC: 9 MG/DL
CHLORIDE SERPL-SCNC: 103 MMOL/L
CHOLEST SERPL-MCNC: 210 MG/DL
CHOLEST/HDLC SERPL: 5.8 RATIO
CO2 SERPL-SCNC: 24 MMOL/L
CREAT SERPL-MCNC: 0.67 MG/DL
EOSINOPHIL # BLD AUTO: 0.13 K/UL
EOSINOPHIL NFR BLD AUTO: 1.6 %
ESTIMATED AVERAGE GLUCOSE: 114 MG/DL
GLUCOSE SERPL-MCNC: 90 MG/DL
HBA1C MFR BLD HPLC: 5.6 %
HBV CORE IGG+IGM SER QL: NONREACTIVE
HBV SURFACE AB SER QL: REACTIVE
HBV SURFACE AG SER QL: NONREACTIVE
HCT VFR BLD CALC: 42.3 %
HCV AB SER QL: NONREACTIVE
HCV S/CO RATIO: 0.09 S/CO
HDLC SERPL-MCNC: 36 MG/DL
HGB BLD-MCNC: 13.9 G/DL
HIV1+2 AB SPEC QL IA.RAPID: NONREACTIVE
IMM GRANULOCYTES NFR BLD AUTO: 0.2 %
LDLC SERPL CALC-MCNC: 148 MG/DL
LYMPHOCYTES # BLD AUTO: 2.61 K/UL
LYMPHOCYTES NFR BLD AUTO: 31.4 %
MAN DIFF?: NORMAL
MCHC RBC-ENTMCNC: 30.4 PG
MCHC RBC-ENTMCNC: 32.9 GM/DL
MCV RBC AUTO: 92.6 FL
MONOCYTES # BLD AUTO: 0.48 K/UL
MONOCYTES NFR BLD AUTO: 5.8 %
N GONORRHOEA RRNA SPEC QL NAA+PROBE: NOT DETECTED
NEUTROPHILS # BLD AUTO: 5.02 K/UL
NEUTROPHILS NFR BLD AUTO: 60.4 %
PLATELET # BLD AUTO: 248 K/UL
POTASSIUM SERPL-SCNC: 4.6 MMOL/L
PROT SERPL-MCNC: 7.2 G/DL
RBC # BLD: 4.57 M/UL
RBC # FLD: 13.2 %
SODIUM SERPL-SCNC: 141 MMOL/L
SOURCE AMPLIFICATION: NORMAL
T PALLIDUM AB SER QL IA: NEGATIVE
TRIGL SERPL-MCNC: 132 MG/DL
TSH SERPL-ACNC: 2.17 UIU/ML
WBC # FLD AUTO: 8.31 K/UL

## 2019-09-06 ENCOUNTER — APPOINTMENT (OUTPATIENT)
Dept: OPHTHALMOLOGY | Facility: CLINIC | Age: 39
End: 2019-09-06

## 2021-01-06 ENCOUNTER — APPOINTMENT (OUTPATIENT)
Dept: INTERNAL MEDICINE | Facility: CLINIC | Age: 41
End: 2021-01-06
Payer: COMMERCIAL

## 2021-01-06 VITALS
RESPIRATION RATE: 16 BRPM | OXYGEN SATURATION: 99 % | HEART RATE: 91 BPM | DIASTOLIC BLOOD PRESSURE: 70 MMHG | BODY MASS INDEX: 23.78 KG/M2 | TEMPERATURE: 98.1 F | SYSTOLIC BLOOD PRESSURE: 116 MMHG | WEIGHT: 130 LBS

## 2021-01-06 DIAGNOSIS — Z23 ENCOUNTER FOR IMMUNIZATION: ICD-10-CM

## 2021-01-06 DIAGNOSIS — Z11.59 ENCOUNTER FOR SCREENING FOR OTHER VIRAL DISEASES: ICD-10-CM

## 2021-01-06 DIAGNOSIS — Z86.69 PERSONAL HISTORY OF OTHER DISEASES OF THE NERVOUS SYSTEM AND SENSE ORGANS: ICD-10-CM

## 2021-01-06 PROCEDURE — 99395 PREV VISIT EST AGE 18-39: CPT | Mod: 25

## 2021-01-06 PROCEDURE — 99072 ADDL SUPL MATRL&STAF TM PHE: CPT

## 2021-01-06 PROCEDURE — G0444 DEPRESSION SCREEN ANNUAL: CPT

## 2021-01-06 NOTE — HEALTH RISK ASSESSMENT
[0] : 2) Feeling down, depressed, or hopeless: Not at all (0) [Patient reported PAP Smear was normal] : Patient reported PAP Smear was normal [Smoke Detector] : smoke detector [Carbon Monoxide Detector] : carbon monoxide detector [Seat Belt] :  uses seat belt [Sunscreen] : uses sunscreen [HIV test declined] : HIV test declined [[id number]] : 0 [Guns at Home] : no guns at home [PapSmearDate] : 01/17 [HIVDate] : 5139 [HIVComments] : negative [HepatitisCDate] : 08/19 [HepatitisCComments] : negative

## 2021-01-06 NOTE — PHYSICAL EXAM
[No Acute Distress] : no acute distress [Well-Appearing] : well-appearing [Normal Sclera/Conjunctiva] : normal sclera/conjunctiva [PERRL] : pupils equal round and reactive to light [EOMI] : extraocular movements intact [Normal Oropharynx] : the oropharynx was normal [Normal TMs] : both tympanic membranes were normal [Normal Nasal Mucosa] : the nasal mucosa was normal [Supple] : supple [No Lymphadenopathy] : no lymphadenopathy [Thyroid Normal, No Nodules] : the thyroid was normal and there were no nodules present [No Respiratory Distress] : no respiratory distress  [Clear to Auscultation] : lungs were clear to auscultation bilaterally [Normal Rate] : normal rate  [Regular Rhythm] : with a regular rhythm [Normal S1, S2] : normal S1 and S2 [No Murmur] : no murmur heard [Pedal Pulses Present] : the pedal pulses are present [No Edema] : there was no peripheral edema [Soft] : abdomen soft [Non Tender] : non-tender [No HSM] : no HSM [No CVA Tenderness] : no CVA  tenderness [No Spinal Tenderness] : no spinal tenderness [No Joint Swelling] : no joint swelling [Grossly Normal Strength/Tone] : grossly normal strength/tone [No Rash] : no rash [Normal Gait] : normal gait [No Focal Deficits] : no focal deficits [Normal Affect] : the affect was normal [Alert and Oriented x3] : oriented to person, place, and time [Normal Outer Ear/Nose] : the outer ears and nose were normal in appearance [No Accessory Muscle Use] : no accessory muscle use [Normal Supraclavicular Nodes] : no supraclavicular lymphadenopathy [Normal Posterior Cervical Nodes] : no posterior cervical lymphadenopathy [Normal Anterior Cervical Nodes] : no anterior cervical lymphadenopathy [de-identified] : scattered freckles [de-identified] : negative Tinel's

## 2021-01-06 NOTE — HISTORY OF PRESENT ILLNESS
[Health Maintenance] : health maintenance [Spouse] : spouse [___ Daughter(s)] : [unfilled] daughter(s) [___ Son(s)] : [unfilled] son(s) [Mother] : mother [] :  [Working Part-Time] : working part-time [Occupation ___] : occupation: [unfilled] [Never Smoked Cigarettes] : has never smoked cigarettes [Never] : has never used illicit drugs [Good] : good [Healthy Diet] : She consumes a diverse and healthy diet [Premenopausal] : the patient is premenopausal [Regular Exercise] : She exercises regularly [FreeTextEntry1] : CPE [Reg. Dental Visits] : ~He/She~ does not have regular dental visits [Vision Problems] : She denies vision problems [Hearing Loss] : She denies hearing loss [de-identified] : 6/19 [de-identified] : hx flu shot 12/18, hx Tdap 2012, hx hep B series [de-identified] : \par Accompanied by .\par \par Feeling well today.\par Not fasting- wants lab slip\par \par Reports is socially distancing and using precautions for covid prevention.\par Denies sick or covid positive contacts.\par Denies fever, chills, cough or sob.\par \par hx left chest pain, +FH CAD- resolved > 1 yr\par -followed by cardio ([facility] [place])- hx negative stress echo\par \par HLD-\par -eating healthy, cut down on fatty foods\par -exercising: walk 15-20 mins/d and stays active\par \par Reports nl appetite and BMs\par Denies n/v/abd pain, BRBPR or melena\par \par hx CTS- gets sx's on/off at end of work day\par -not using splint regularly\par -no pain meds taken\par -denies weakness\par \par Denies  complaints, has IUD in place- GYN f/u pending, requests referral.\par Denies hx STD dx.\par \par \par Denies depression or anxiety.

## 2021-01-06 NOTE — ASSESSMENT
[FreeTextEntry1] : \par \par hx CTS- sx's on/off, asx currently\par -use splint qhs prn encouraged\par -Tylenol prn \par -check labs: b12, folate, TSH, A1c\par \par HLD- 8/19 Tchol 210   HDL 36\par -low fat diet, exercise advised\par -check lipids\par \par hx atypical CP, +FH CAD- asx since 6/18 per pt\par -followed by cardio ([facility] [place])- hx negative stress echo\par -advised to go to ER if sx's recur\par \par MISC:  Continued social distancing and measure for covid19 prevention encouraged.  \par -Check covid19 AB screen\par \par \par HCM\par -check screening labs; declines HIV/STD screening\par -declines flu shot \par -hx Tdap 2012\par -hx hep B series, immune per 8/19 labs\par -hx negative PAP 2017 by GYN per pt, +IUD in place.  GYN referral given per request, encouraged\par -derm referral for screening.  Regular use of sun block for skin cancer prevention advised.\par -yearly eye screening advised, referral given \par -yearly dental screening advised\par \par Pt request  Jose A be called re: labs/medical care, cell: [phone]\par Pt declines f/u appt, yearly CPE and f/u as needed advised.\par

## 2021-01-06 NOTE — PAST MEDICAL HISTORY
[Irregular Cycle Intervals] : are  irregular [Total Preg ___] : G[unfilled] [Living ___] : Living: [unfilled] [FreeTextEntry1] : has IUD

## 2021-02-08 LAB
25(OH)D3 SERPL-MCNC: 27.9 NG/ML
ALBUMIN SERPL ELPH-MCNC: 4.6 G/DL
ALP BLD-CCNC: 50 U/L
ALT SERPL-CCNC: 21 U/L
ANION GAP SERPL CALC-SCNC: 14 MMOL/L
AST SERPL-CCNC: 18 U/L
BASOPHILS # BLD AUTO: 0.05 K/UL
BASOPHILS NFR BLD AUTO: 0.7 %
BILIRUB SERPL-MCNC: 0.8 MG/DL
BUN SERPL-MCNC: 13 MG/DL
CALCIUM SERPL-MCNC: 9.6 MG/DL
CHLORIDE SERPL-SCNC: 102 MMOL/L
CHOLEST SERPL-MCNC: 234 MG/DL
CO2 SERPL-SCNC: 23 MMOL/L
CREAT SERPL-MCNC: 0.77 MG/DL
EOSINOPHIL # BLD AUTO: 0.12 K/UL
EOSINOPHIL NFR BLD AUTO: 1.7 %
ESTIMATED AVERAGE GLUCOSE: 117 MG/DL
FOLATE SERPL-MCNC: 15.3 NG/ML
GLUCOSE SERPL-MCNC: 91 MG/DL
HBA1C MFR BLD HPLC: 5.7 %
HCT VFR BLD CALC: 44 %
HDLC SERPL-MCNC: 35 MG/DL
HGB BLD-MCNC: 14 G/DL
IMM GRANULOCYTES NFR BLD AUTO: 0.3 %
LDLC SERPL CALC-MCNC: 157 MG/DL
LYMPHOCYTES # BLD AUTO: 2.26 K/UL
LYMPHOCYTES NFR BLD AUTO: 32.8 %
MAN DIFF?: NORMAL
MCHC RBC-ENTMCNC: 30 PG
MCHC RBC-ENTMCNC: 31.8 GM/DL
MCV RBC AUTO: 94.4 FL
MONOCYTES # BLD AUTO: 0.4 K/UL
MONOCYTES NFR BLD AUTO: 5.8 %
NEUTROPHILS # BLD AUTO: 4.04 K/UL
NEUTROPHILS NFR BLD AUTO: 58.7 %
NONHDLC SERPL-MCNC: 199 MG/DL
PLATELET # BLD AUTO: 265 K/UL
POTASSIUM SERPL-SCNC: 4.3 MMOL/L
PROT SERPL-MCNC: 7.2 G/DL
RBC # BLD: 4.66 M/UL
RBC # FLD: 12.7 %
SARS-COV-2 IGG SERPL IA-ACNC: 0.06 INDEX
SARS-COV-2 IGG SERPL QL IA: NEGATIVE
SODIUM SERPL-SCNC: 138 MMOL/L
TRIGL SERPL-MCNC: 211 MG/DL
TSH SERPL-ACNC: 2.59 UIU/ML
VIT B12 SERPL-MCNC: 616 PG/ML
WBC # FLD AUTO: 6.89 K/UL

## 2021-03-11 ENCOUNTER — APPOINTMENT (OUTPATIENT)
Age: 41
End: 2021-03-11
Payer: COMMERCIAL

## 2021-03-11 VITALS
SYSTOLIC BLOOD PRESSURE: 125 MMHG | BODY MASS INDEX: 24.03 KG/M2 | HEART RATE: 90 BPM | TEMPERATURE: 97.7 F | DIASTOLIC BLOOD PRESSURE: 86 MMHG | HEIGHT: 62 IN | WEIGHT: 130.56 LBS

## 2021-03-11 DIAGNOSIS — Z12.4 ENCOUNTER FOR SCREENING FOR MALIGNANT NEOPLASM OF CERVIX: ICD-10-CM

## 2021-03-11 PROCEDURE — 58301 REMOVE INTRAUTERINE DEVICE: CPT

## 2021-03-11 PROCEDURE — 99072 ADDL SUPL MATRL&STAF TM PHE: CPT

## 2021-03-11 PROCEDURE — 99385 PREV VISIT NEW AGE 18-39: CPT | Mod: 25

## 2021-03-11 NOTE — PROCEDURE
[IUD Removal] : intrauterine device (IUD) removal [Risks] : risks [Speculum Placed] : speculum placed [IUD Removed - Forceps] : IUD removed - forceps [IUD Discarded] : IUD discarded [Tolerated Well] : Patient tolerated the procedure well [No Complications] : no complications

## 2021-03-12 LAB — HPV HIGH+LOW RISK DNA PNL CVX: NOT DETECTED

## 2021-03-16 LAB — CYTOLOGY CVX/VAG DOC THIN PREP: NORMAL

## 2022-03-17 ENCOUNTER — APPOINTMENT (OUTPATIENT)
Dept: OBGYN | Facility: CLINIC | Age: 42
End: 2022-03-17

## 2022-04-11 PROBLEM — Z11.59 SCREENING FOR VIRAL DISEASE: Status: ACTIVE | Noted: 2021-01-06

## 2022-06-29 ENCOUNTER — APPOINTMENT (OUTPATIENT)
Dept: INTERNAL MEDICINE | Facility: CLINIC | Age: 42
End: 2022-06-29
Payer: COMMERCIAL

## 2022-06-29 VITALS
HEART RATE: 80 BPM | SYSTOLIC BLOOD PRESSURE: 112 MMHG | OXYGEN SATURATION: 99 % | DIASTOLIC BLOOD PRESSURE: 77 MMHG | TEMPERATURE: 98.8 F | BODY MASS INDEX: 23.19 KG/M2 | WEIGHT: 126 LBS | HEIGHT: 62 IN

## 2022-06-29 DIAGNOSIS — Z97.5 PRESENCE OF (INTRAUTERINE) CONTRACEPTIVE DEVICE: ICD-10-CM

## 2022-06-29 PROCEDURE — 99395 PREV VISIT EST AGE 18-39: CPT

## 2022-06-29 RX ORDER — LEVONORGESTREL 52 MG/1
INTRAUTERINE DEVICE INTRAUTERINE
Refills: 0 | Status: DISCONTINUED | COMMUNITY
End: 2022-06-29

## 2022-06-29 RX ORDER — UBIDECARENONE/VIT E ACET 100MG-5
50 MCG CAPSULE ORAL
Refills: 0 | Status: DISCONTINUED | COMMUNITY
End: 2022-06-29

## 2022-06-29 NOTE — ASSESSMENT
[FreeTextEntry1] : Hyperlipidemia:\par Eating healthier\par Encouraged to get more regular physical activity\par Labs today, fasting\par \par HM:\par Cervical cancer screening - pap done 3/2021\par Dental - due \par tdap - unsure of last, declines today\par COVID vaccine - did first dose in November (Moderna), needs to schedule second\par check labs\par \par

## 2022-06-29 NOTE — PHYSICAL EXAM
[No Acute Distress] : no acute distress [Well-Appearing] : well-appearing [No Carotid Bruits] : no carotid bruits [Pedal Pulses Present] : the pedal pulses are present [No Edema] : there was no peripheral edema [Coordination Grossly Intact] : coordination grossly intact [No Focal Deficits] : no focal deficits [Normal Gait] : normal gait [Normal] : affect was normal and insight and judgment were intact

## 2022-06-29 NOTE — HEALTH RISK ASSESSMENT
[Good] : ~his/her~  mood as  good [Never] : Never [No] : In the past 12 months have you used drugs other than those required for medical reasons? No [0] : 2) Feeling down, depressed, or hopeless: Not at all (0) [PHQ-2 Negative - No further assessment needed] : PHQ-2 Negative - No further assessment needed [de-identified] : 20-30 min 3-4x week [[id number]] : 0 [Reports changes in hearing] : Reports no changes in hearing [Reports changes in vision] : Reports no changes in vision [Reports changes in dental health] : Reports no changes in dental health [Smoke Detector] : smoke detector [Carbon Monoxide Detector] : carbon monoxide detector [Guns at Home] : no guns at home [Sunscreen] : uses sunscreen [PapSmearDate] : 03/21

## 2022-06-30 LAB
25(OH)D3 SERPL-MCNC: 26.3 NG/ML
ALBUMIN SERPL ELPH-MCNC: 4.7 G/DL
ALP BLD-CCNC: 44 U/L
ALT SERPL-CCNC: 23 U/L
ANION GAP SERPL CALC-SCNC: 12 MMOL/L
AST SERPL-CCNC: 23 U/L
BASOPHILS # BLD AUTO: 0.04 K/UL
BASOPHILS NFR BLD AUTO: 0.6 %
BILIRUB SERPL-MCNC: 0.5 MG/DL
BUN SERPL-MCNC: 12 MG/DL
CALCIUM SERPL-MCNC: 9.3 MG/DL
CHLORIDE SERPL-SCNC: 102 MMOL/L
CHOLEST SERPL-MCNC: 223 MG/DL
CO2 SERPL-SCNC: 26 MMOL/L
CREAT SERPL-MCNC: 0.72 MG/DL
EGFR: 109 ML/MIN/1.73M2
EOSINOPHIL # BLD AUTO: 0.19 K/UL
EOSINOPHIL NFR BLD AUTO: 2.9 %
ESTIMATED AVERAGE GLUCOSE: 117 MG/DL
GLUCOSE SERPL-MCNC: 102 MG/DL
HBA1C MFR BLD HPLC: 5.7 %
HCT VFR BLD CALC: 42.4 %
HDLC SERPL-MCNC: 42 MG/DL
HGB BLD-MCNC: 14.1 G/DL
IMM GRANULOCYTES NFR BLD AUTO: 0.3 %
LDLC SERPL CALC-MCNC: 143 MG/DL
LYMPHOCYTES # BLD AUTO: 1.7 K/UL
LYMPHOCYTES NFR BLD AUTO: 26.3 %
MAN DIFF?: NORMAL
MCHC RBC-ENTMCNC: 30.7 PG
MCHC RBC-ENTMCNC: 33.3 GM/DL
MCV RBC AUTO: 92.2 FL
MONOCYTES # BLD AUTO: 0.37 K/UL
MONOCYTES NFR BLD AUTO: 5.7 %
NEUTROPHILS # BLD AUTO: 4.15 K/UL
NEUTROPHILS NFR BLD AUTO: 64.2 %
NONHDLC SERPL-MCNC: 181 MG/DL
PLATELET # BLD AUTO: 277 K/UL
POTASSIUM SERPL-SCNC: 4.4 MMOL/L
PROT SERPL-MCNC: 7.5 G/DL
RBC # BLD: 4.6 M/UL
RBC # FLD: 13.7 %
SODIUM SERPL-SCNC: 140 MMOL/L
TRIGL SERPL-MCNC: 188 MG/DL
WBC # FLD AUTO: 6.47 K/UL

## 2023-03-05 ENCOUNTER — NON-APPOINTMENT (OUTPATIENT)
Age: 43
End: 2023-03-05

## 2023-03-06 ENCOUNTER — NON-APPOINTMENT (OUTPATIENT)
Age: 43
End: 2023-03-06

## 2023-03-06 ENCOUNTER — APPOINTMENT (OUTPATIENT)
Dept: OPHTHALMOLOGY | Facility: CLINIC | Age: 43
End: 2023-03-06
Payer: COMMERCIAL

## 2023-03-06 PROCEDURE — 92004 COMPRE OPH EXAM NEW PT 1/>: CPT

## 2023-05-30 ENCOUNTER — APPOINTMENT (OUTPATIENT)
Dept: INTERNAL MEDICINE | Facility: CLINIC | Age: 43
End: 2023-05-30

## 2023-10-12 ENCOUNTER — APPOINTMENT (OUTPATIENT)
Dept: INTERNAL MEDICINE | Facility: CLINIC | Age: 43
End: 2023-10-12
Payer: COMMERCIAL

## 2023-10-12 VITALS
WEIGHT: 132 LBS | BODY MASS INDEX: 24.29 KG/M2 | OXYGEN SATURATION: 99 % | HEART RATE: 84 BPM | SYSTOLIC BLOOD PRESSURE: 118 MMHG | TEMPERATURE: 98.6 F | HEIGHT: 62 IN | DIASTOLIC BLOOD PRESSURE: 81 MMHG

## 2023-10-12 DIAGNOSIS — Z00.00 ENCOUNTER FOR GENERAL ADULT MEDICAL EXAMINATION W/OUT ABNORMAL FINDINGS: ICD-10-CM

## 2023-10-12 DIAGNOSIS — E78.5 HYPERLIPIDEMIA, UNSPECIFIED: ICD-10-CM

## 2023-10-12 PROCEDURE — 99396 PREV VISIT EST AGE 40-64: CPT

## 2023-12-18 DIAGNOSIS — E55.9 VITAMIN D DEFICIENCY, UNSPECIFIED: ICD-10-CM

## 2023-12-18 LAB
25(OH)D3 SERPL-MCNC: 19.3 NG/ML
ALBUMIN SERPL ELPH-MCNC: 4.3 G/DL
ALP BLD-CCNC: 48 U/L
ALT SERPL-CCNC: 18 U/L
ANION GAP SERPL CALC-SCNC: 10 MMOL/L
AST SERPL-CCNC: 16 U/L
BILIRUB SERPL-MCNC: 0.6 MG/DL
BUN SERPL-MCNC: 12 MG/DL
CALCIUM SERPL-MCNC: 9 MG/DL
CHLORIDE SERPL-SCNC: 102 MMOL/L
CHOLEST SERPL-MCNC: 243 MG/DL
CO2 SERPL-SCNC: 26 MMOL/L
CREAT SERPL-MCNC: 0.73 MG/DL
EGFR: 106 ML/MIN/1.73M2
ESTIMATED AVERAGE GLUCOSE: 117 MG/DL
FOLATE SERPL-MCNC: 13.5 NG/ML
GLUCOSE SERPL-MCNC: 97 MG/DL
HBA1C MFR BLD HPLC: 5.7 %
HCT VFR BLD CALC: 39.8 %
HDLC SERPL-MCNC: 40 MG/DL
HGB BLD-MCNC: 12.8 G/DL
LDLC SERPL CALC-MCNC: 167 MG/DL
MCHC RBC-ENTMCNC: 30.2 PG
MCHC RBC-ENTMCNC: 32.2 GM/DL
MCV RBC AUTO: 93.9 FL
NONHDLC SERPL-MCNC: 203 MG/DL
PLATELET # BLD AUTO: 288 K/UL
POTASSIUM SERPL-SCNC: 4.3 MMOL/L
PROT SERPL-MCNC: 6.7 G/DL
RBC # BLD: 4.24 M/UL
RBC # FLD: 13.4 %
SODIUM SERPL-SCNC: 138 MMOL/L
TRIGL SERPL-MCNC: 196 MG/DL
TSH SERPL-ACNC: 2.9 UIU/ML
VIT B12 SERPL-MCNC: 561 PG/ML
WBC # FLD AUTO: 7.31 K/UL

## 2023-12-18 RX ORDER — ERGOCALCIFEROL 1.25 MG/1
1.25 MG CAPSULE, LIQUID FILLED ORAL
Qty: 12 | Refills: 1 | Status: ACTIVE | COMMUNITY
Start: 2023-12-18 | End: 1900-01-01

## 2024-10-30 ENCOUNTER — DOCTOR'S OFFICE (OUTPATIENT)
Facility: LOCATION | Age: 44
Setting detail: OPHTHALMOLOGY
End: 2024-10-30
Payer: COMMERCIAL

## 2024-10-30 DIAGNOSIS — H52.7: ICD-10-CM

## 2024-10-30 DIAGNOSIS — H01.001: ICD-10-CM

## 2024-10-30 DIAGNOSIS — H52.4: ICD-10-CM

## 2024-10-30 PROCEDURE — 92004 COMPRE OPH EXAM NEW PT 1/>: CPT | Performed by: OPHTHALMOLOGY

## 2024-10-30 PROCEDURE — 92015 DETERMINE REFRACTIVE STATE: CPT | Performed by: OPHTHALMOLOGY

## 2024-10-30 ASSESSMENT — CONFRONTATIONAL VISUAL FIELD TEST (CVF)
OS_FINDINGS: FULL
OD_FINDINGS: FULL

## 2024-10-30 ASSESSMENT — REFRACTION_MANIFEST
OS_AXIS: 160
OD_VA1: 20/20
OD_SPHERE: -1.25
OS_ADD: +1.50
OD_CYLINDER: +1.00
OS_SPHERE: -1.00
OS_CYLINDER: +0.75
OD_ADD: +1.50
OD_AXIS: 020
OS_VA1: 20/20
OD_SPECTYPE: PROGS
OS_SPECTYPE: PROGS

## 2024-10-30 ASSESSMENT — REFRACTION_AUTOREFRACTION
OS_SPHERE: -1.25
OD_CYLINDER: +1.50
OS_CYLINDER: +1.25
OD_AXIS: 020
OS_AXIS: 160
OD_SPHERE: -1.25

## 2024-10-30 ASSESSMENT — TONOMETRY
OD_IOP_MMHG: 15
OS_IOP_MMHG: 15

## 2024-10-30 ASSESSMENT — KERATOMETRY
OD_AXISANGLE_DEGREES: 055
OS_AXISANGLE_DEGREES: 132
OS_K1POWER_DIOPTERS: 43.25
OD_K1POWER_DIOPTERS: 42.75
OS_K2POWER_DIOPTERS: 44.00
OD_K2POWER_DIOPTERS: 43.75

## 2024-10-30 ASSESSMENT — VISUAL ACUITY
OD_BCVA: 20/20-2
OS_BCVA: 20/25-

## 2024-10-30 ASSESSMENT — LID EXAM ASSESSMENTS: OD_BLEPHARITIS: RUL T

## 2024-11-18 NOTE — COUNSELING
Your Child's Health  Over 15 Year Old      Alessandra Dickinson  November 18, 2024    Visit Vitals  /74 (BP Location: LUE - Left upper extremity, Patient Position: Sitting, Cuff Size: Regular)   Pulse 78   Temp 97.3 °F (36.3 °C) (Temporal)   Resp 18   Ht 5' 2.75\" (1.594 m)   Wt 57.2 kg (126 lb)   LMP 10/07/2024 (Approximate)   BMI 22.50 kg/m²     Weight: 126 lbs      Your Teen Check-Up Visit  Body Image  Teens are faced with a lot of pressure from the media to look a certain way. Many of the body images portrayed in the media are not healthy. The age old combination of healthy eating and being physically active is the best way to stay at a healthy weight.    Eating, Drinking & Exercise  As teens spend more time away from home, the temptation to eat more high-fat, high calorie convenience foods is common. (It's also tempting to eat more than you need, especially when hanging out with friends, but it's important to stop eating when you feel full.) Learn about how to make healthy choices when not at home, and consider carrying healthy snacks from home rather than relying on vending machines and fast food when out. (The [employer] choosemyplate.gov is a great educational website about nutrition.) Getting enough vitamin D and calcium is important for good bone health. Teens need to get 3 to 4 servings of a good source of calcium daily (low-fat or skim milk, yogurt, cheese, calcium-fortified orange juice or other calcium-fortified foods). Vitamin D is needed along with calcium to optimize bone health; 600 IU of vitamin D daily is recommended. Most people cannot meet their vitamin D needs with their usual diet, so a multivitamin with iron supplement is a good way to get it. (A pure vitamin D supplement with 400 or 600 IU is also okay.)    Choosing to drink plenty of water and avoiding or limiting sodas, energy drinks, juices and other sweet drinks (iced tea, etc) is an easy and healthy choice to make. It is common for  [Healthy eating counseling provided] : healthy eating teenagers to skip breakfast due to time constraints and simply not feeling hungry in the morning. However, eating something healthy in the morning is important in order to function best at school and avoid overeating later in the day. Teens should set a goal of being physically active for at least 60 minutes a day. This can be tough because of more homework and because gym classes are often not required in the higher grades. If you're not involved in sports, find activities that you like, such as biking, swimming, and dancing. [When participating in sports, make sure to use appropriate safety equipment (helmet, mouth guard, eye protection, wrist guards, elbow and knee pads, athletic supporter).] Choose biking and walking as your mode of transportation whenever it is safe to do so. Choosing to spend time on your phone or computer is a lot easier and a lot more tempting than putting your electronics down and making yourself move - but it is really important to your overall health.      Sleep   Teenagers need a lot of sleep. It can be difficult because most teens don't feel the need to go to sleep until later in the evening, and then they have to get up for school before they've had adequate rest. Keeping your phone, TV, and other electronic media out of your room and avoiding using them in the hour so before bedtime can help you sleep better. Also, avoid drinking a lot of caffeine, especially later in the day.     Dental  It is important to take good care of your permanent teeth - this is the last set you are going to get! Brush at least twice a day (always before bed) with fluoridated toothpaste, floss regularly and see a dentist twice a year. If your home water supply is from a well, let us know - fluoride supplements may be recommended up to 16 years of age.    Violence & Bullying  Violence is out there. Any exposure to violence (as a victim, witness or perpetrator) is dangerous and harmful (emotionally and  [Activity counseling provided] : activity physically). Do your best to avoid situations where you know there is a risk of violence, bullying or fighting. Try to stay in a group when you are going between places or on outings. If you are being teased or bullied, stand up to the person doing it if you can--remember, bullies want to see their victims upset, so be calm, don’t appear flustered, tell them to stop it and walk away. Laugh at them if you can; joking will throw a bully off guard because that is not the response they expect. If you or someone you know is being bullied or harmed, ask a guidance counselor, , health care provider or other trusted adult about what you can do to resolve the situation. Most schools have strict policies in place to protect against bullying. Don’t start skipping school to avoid a bully; talk to someone because things can be better.     Be very careful about what you post online--to protect yourself from being attacked as well as to make sure something you post will not be interpreted as hurtful or critical. Nothing is truly private in cyberspace; make sure you do not post anything online (texts, photos, emails) that you would not be comfortable having read out loud in a public place.     If you are dating, violence should NEVER be tolerated in a relationship-- this includes physical violence, emotional abuse (shaming, embarrassing, threatening, controlling) or sexual abuse (forcing a partner to participate in a sex act when they do not or cannot consent to it).  If you are uncomfortable with anything in your relationship, talk about it now. If you can’t talk to your partner, talk to a trusted adult, guidance counselor, teacher, or health care provider. If you (or a friend) need help right away, there are hotlines are available. One is through [employer]: call 1-855.417.8784, chat at loveisrespect.org or text “sayra” to 50336 (available 24/7/365). You can also call the National Domestic Violence  1-961.282.5654.     Healthy Emotions  Being a teen can be tough. Demands of school, relationship challenges (friends, family, dating), and new responsibilities and expectations can lead to stress that may sometimes seem overwhelming. Depression and anxiety can affect teens; they can interfere with your day to day functioning and keep you from enjoying things that you usually enjoy. When you're feeling stressed out and overwhelmed, the most important thing to do is talk to someone that you trust and who cares about you. Alcohol, drugs or self-harming acts will not solve any of your problems and will ultimately only make things worse. If you (or a friend) are ever feeling overwhelmed by sadness or fear or anxiety to the point that don't feel you can do what you need to do from day to day or that you wished you were not alive, you need to ask for help. If you don't have a trusted adult in your life, talk to a friend, a teacher, a counselor or health care provider. There is an anonymous emergency hotline (through National Suicide Prevention Lifeline) for teens who are feeling desperate enough to hurt themselves: chat at https://suicidepreventionlifeline.org/ or call 1-972.859.7866. Hopefully you will get through your teenage years without any significant emotional difficulties, but you may have friends who struggle. Be there for them, and help them get help if you can see that they need it.    Sex, Drugs, & Rock n Roll  Thinking about sex and relationships at your age is normal. Teens may have questions about their sexual orientation and gender identity. You can always talk to your healthcare providers when you have questions about these issues. You should know that everyone is entitled to complete, nonjudgmental and confidential health at this clinic. We also encourage you to talk to your family and friends, if you believe they will be supportive. If you are unable to talk to your family or if you need additional  support, there are plenty of resources which we can refer you to. Good online resources include https://www.cdc.gov/lgbthealth/youth-resources.htm and http://www.[facility].org/programs/youth/.    You are undoubtedly aware of the risks associated with having sex. Because teens who have babies or father a child are faced with the tremendous responsibilities and challenges of caring for a child, their own goals and dreams become more difficult to fulfill and may even be altered because of the responsibility of caring for a child. Also, pregnancy can carry high medical risks in young teens. There are several contraception (birth control) options for teens to help prevent pregnancy - but the most effective one is still choosing not to have sex. (Large national studies of teenagers show that most teens who had sex at a young age wish they had waited longer.) Sexually-transmitted infections (STIs) are another major risk of having sex. It is estimated that about 20 million new STIs occur every year, and about half of those are in teens and young adults between 15 to 24 years of age. Female teens, in particular, are at risk for complications from STIs; some of which can cause them to have problems with their fertility (their ability to have children) later in life when they want to be pregnant. To protect against STI's, condoms need to be used during every sexual encounter, even if a female is using another form of contraception. For teens who choose to be sexually active, it is very important that they have regular health care check-ups to discuss contraception and perform STI screening if indicated. Plus, you should see a health care provided any time you are concerned about a possible problem (pregnancy, STI) or want to discuss birth control. Despite what you see on TV and in the media, you should know that most teenagers your age choose NOT to have sex. There is a lot of pressure out there for teens to experiment sexually,  so make decisions to avoid places (and people) where you know that kind of pressure might be put on you. For reliable information about sex and birth control, good websites are safeteens.org and bedsider.org.    If you make the decision to become sexually active, you should know that in the [facility], teens who are age 14 or older are entitled to confidential reproductive health care - that means that you can talk to your providers and receive care for sex-related issues (contraception, STIs, pregnancy) without your parents being notified. We encourage teens to talk to their parents when they are considering starting to have sex, but not everyone can--that is why this law exists. (The only exception to confidential care would be if your providers believe you are at risk for harming yourself or someone else.) Sexually active teens should learn about emergency contraception (\"the morning after pill \"or \"Plan B) which can significantly reduce the chance of pregnancy if a young woman takes it shortly after having unprotected sex. You should also know that even if you make the decision to have sex once, you do not have to keep doing it if you didn’t feel like it was the right decision for you.     Most teens have heard plenty by now about the dangers and health risks of alcohol, drugs and smoking. Some people think vaping is a safe alternative to smoking, but there is no proof that it is. Any inhaled substance is going to cause harm to your lungs and most can cause harm to the brain as well. You should not take prescription medicines if they were not prescribed for you, and you should never let anyone else take your prescription drugs. Avoid situations where you know there is likely to be alcohol and drugs which you will be pressured to try; hang out with friends who share your decision not to use these substances. In addition to the dangers associated with drug use, legal involvement and drug testing policies  mean using drugs today can interfere with sports, job and college opportunities. You can talk to your health care provider about drug use if you have questions or concerns; good online resources include https://teens.drugabuse.gov/ and https://www.thecoYast.gov/real_life4.aspx  .    Industries have rules about protecting workers from loud noises because they are known to cause hearing loss. Nearly 6% of teenagers were identified as having a mild level of permanent hearing loss due to loud music and ear bud use in a national study. To protect your hearing, avoid prolonged use of earbuds and music at loud volumes and protect yourself with earplugs or other hearing protection devices) when exposed to loud noises (concerts, lawnmowers, snowblowers, etc.).    Safety on Wheels  The leading cause of death for adolescents is motor vehicle accidents. Wearing a seatbelt significantly reduces your chance of serious injury or death when riding in a car. If you ever find yourself in a situation where do not feel safe with the  of your car (whether that is yourself or someone else), the right decision is to call for a ride from someone else. This could be a matter of life or death-do not hesitate to make this type of call.    Texting, calling or using any kind of electronic device is distracting to a  and the cause of many serious accidents. Whether it is you or someone else driving, drivers should never be using mobile devices when they are behind the wheel. Put your phone out of reach or in the trunk if you do not trust yourself to ignore it while driving.    Safety in the Sun  Protecting yourself from the sun’s UV radiation is your responsibility - your parent is no longer going to bella after you to apply sunscreen. Whether you tan or burn, spending time in the sun without sunscreen protection increases your risk of skin cancer and premature skin aging. To protect yourself, always wear a generous amount of  sunscreen with an SPF of 15 or higher, reapply it frequently, avoid prolonged time in the sun between 11 AM and 3 PM (when the sun is the hottest), and wear sunglasses and sun protective clothing when in the sun. Use of tanning beds further increases the risk of skin cancer; tanning bed use is illegal for teens under age 16 years old in [place] and many  want  to increase that law to apply to everyone under 18 years of age because of the health risks.    Helmets should always be worn in riding a skateboard, bike, motorcycle, or ATV. They should also be worn when skating or skateboarding. When boating or doing water sports, always wear a [employer] approved lifejacket, even if you feel you are a good swimmer.    Gun Safety  Firearms are dangerous. If someone you know has a firearm, you should not attempt handle it or use it. Gun safety courses are usually available for teens who want to hunt, but even with training, you should never handle or use a firearm without experienced supervision. Carrying a handgun for protection is not recommended because it is dangerous, and it is illegal under the age of 21.      MEDICATION FOR FEVER OR PAIN:   Acetaminophen liquid (e.g., Tylenol or Tempra) may be given every four hours as needed for pain or fever.  Acetaminophen liquid is less concentrated than the infant dropper bottle type.  Be sure to check which product CONCENTRATION you are using.      CHILDREN’S Tylenol/Acetaminophen  (160 MG/5 mL)    Child’s Weight:  Dose:  36 - 47 pounds:    240 mg (7.5 mL (1 1/2 Teaspoons))  48 - 59 pounds:    320 mg (10.0 mL (2 Teaspoons))  60 - 71 pounds:    400 mg (12.5 mL (2 1/2 Teaspoons))  72 - 95 pounds:    480 mg (15.0 mL (3 Teaspoons))  Greater than 96 pounds:   640 mg (20.0 mL (4 Teaspoons))    CHILDREN’S Tylenol/Acetaminophen MELTAWAYS ( 80 MG tablets)    Child’s Weight:  Dose:  36 - 47 pounds:    240 mg (3 meltaway tablets)  48 - 59 pounds:    320 mg (4 meltaway  tablets)  60 - 71 pounds:    400 mg (5 meltaway tablets)  72 - 95 pounds:    480 mg (6 meltaway tablets)  Greater than 96 pounds:   640 mg (8 meltaway tablets)     () Tylenol/Acetaminophen MELTAWAYS (160 MG tablets)    Child’s Weight:  Dose:  36 - 47 pounds:    240 mg (1 1/2 meltaway tablets)  48 - 59 pounds:    320 mg (2 meltaway tablets)  60 - 71 pounds:    400 mg (2 1/2 meltaway tablets)  72 - 95 pounds:    480 mg (3 meltaway tablets)  Greater than 96 pounds:   640 mg (4 meltaway tablets)      CHILDREN'S Ibuprofen (e.g., Advil or Motrin) may be given every six hours as needed for pain or fever.    48 - 59 pounds:                       200 mg (2 Teaspoons)  60 - 71 pounds:                       250 mg (2 1/2 Teaspoons)  72 - 95 pounds:                       300 mg (3 Teaspoons)    NEXT VISIT: 1 year      Thank you for entrusting your care to [facility].    Also, check out “Children’s Health” on the [facility] Blog for updates on timely topics regarding children’s health!

## 2024-11-25 ENCOUNTER — APPOINTMENT (OUTPATIENT)
Dept: INTERNAL MEDICINE | Facility: CLINIC | Age: 44
End: 2024-11-25
Payer: MEDICAID

## 2024-11-25 VITALS
SYSTOLIC BLOOD PRESSURE: 123 MMHG | TEMPERATURE: 98.2 F | WEIGHT: 127 LBS | OXYGEN SATURATION: 98 % | BODY MASS INDEX: 23.23 KG/M2 | DIASTOLIC BLOOD PRESSURE: 79 MMHG

## 2024-11-25 DIAGNOSIS — E78.5 HYPERLIPIDEMIA, UNSPECIFIED: ICD-10-CM

## 2024-11-25 DIAGNOSIS — Z00.00 ENCOUNTER FOR GENERAL ADULT MEDICAL EXAMINATION W/OUT ABNORMAL FINDINGS: ICD-10-CM

## 2024-11-25 DIAGNOSIS — E55.9 VITAMIN D DEFICIENCY, UNSPECIFIED: ICD-10-CM

## 2024-11-25 PROCEDURE — 99386 PREV VISIT NEW AGE 40-64: CPT

## 2024-11-25 RX ORDER — NORGESTREL AND ETHINYL ESTRADIOL 0.3-0.03MG
0.3-3 KIT ORAL
Qty: 28 | Refills: 0 | Status: ACTIVE | COMMUNITY
Start: 2024-07-18

## 2024-12-22 NOTE — PATIENT PROFILE OB - AS LABOR ROM TYPE
Pt here with drainage from R ear since Wednesday 12/18 has ear tubes in place since September . denies fevers.
spontaneous rupture

## 2025-03-11 NOTE — DISCHARGE NOTE OB - NS OB DC IMMUNIZATIONS2 YN
Refill Routing Note   Medication(s) are not appropriate for processing by [facility] for the following reason(s):        Outside of protocol    [facility] action(s):  Route             Appointments  past 12m or future 3m with PCP    Date Provider   Last Visit   1/6/2025 Cara Hsu MD   Next Visit   4/1/2025 Cara Hsu MD   ED visits in past 90 days: 0        Note composed:3:41 PM 03/11/2025                   No

## 2025-07-01 ENCOUNTER — APPOINTMENT (OUTPATIENT)
Dept: INTERNAL MEDICINE | Facility: CLINIC | Age: 45
End: 2025-07-01
Payer: MEDICAID

## 2025-07-01 PROCEDURE — G2211 COMPLEX E/M VISIT ADD ON: CPT | Mod: NC,95

## 2025-07-01 PROCEDURE — 99214 OFFICE O/P EST MOD 30 MIN: CPT | Mod: 95
